# Patient Record
Sex: MALE | Race: WHITE
[De-identification: names, ages, dates, MRNs, and addresses within clinical notes are randomized per-mention and may not be internally consistent; named-entity substitution may affect disease eponyms.]

---

## 2019-12-19 VITALS
SYSTOLIC BLOOD PRESSURE: 124 MMHG | DIASTOLIC BLOOD PRESSURE: 66 MMHG | DIASTOLIC BLOOD PRESSURE: 66 MMHG | SYSTOLIC BLOOD PRESSURE: 124 MMHG

## 2021-06-16 ENCOUNTER — HOSPITAL ENCOUNTER (OUTPATIENT)
Dept: HOSPITAL 61 - NM | Age: 41
End: 2021-06-16
Attending: INTERNAL MEDICINE
Payer: COMMERCIAL

## 2021-06-16 DIAGNOSIS — R07.9: Primary | ICD-10-CM

## 2021-06-16 DIAGNOSIS — Z95.1: ICD-10-CM

## 2021-06-16 PROCEDURE — 78452 HT MUSCLE IMAGE SPECT MULT: CPT

## 2021-06-16 PROCEDURE — 93017 CV STRESS TEST TRACING ONLY: CPT

## 2021-06-16 PROCEDURE — A9500 TC99M SESTAMIBI: HCPCS

## 2021-06-16 NOTE — RAD
MR#: [id number]

Account#: [id number]

Accession#: [id number]

Date of Study: 06/16/2021

Ordering Physician: JOHN HANSEN, 

Referring Physician: TREVOR LOCKWOOD Tech: KARMEN Rosas, ARRT (R) (N)





--------------- APPROVED REPORT --------------





Test Type:          Exercise

Stress Nurse/Tech: Leonila Montero RN

Test Indications: Chest pain x2 months

Cardiac History: CABG 2018,MI

Medications:     See Electronic Medical Record

Medical History: See Electronic Medical Record

Resting ECG:     SB with RBBB

Resting Heart Rate: 55 bpm

Resting Blood Pressure: 128/82mmHg

Pretest Chest Pain: No chest pain



Nurse/Tech Notes

S1,S2 and lungs clear to auscultation.

Consent: The procedure was explained to the patient in lay terms. Informed consent was witnessed. Corbin
eout was entered into [employer]. History and Stress Test performed by RT Cornelius (DINA) (N)



Stress Symptoms

Dyspnea, slight chest pressure during recovery phase of study which resolved by end of study



POST EXERCISE

Reason for Termination: Reached target heart rate, Fatigue

Target HR: Yes

Max HR: 169 bpm

94% of Maximum Predicted HR: 181 bpm

Exercise duration: 11:00 min:sec, 4 Stage

Exercise capacity: 13.4METs

Max Blood Pressure: 153/78mmHg

Blood Pressure response to exercise: Normal blood pressure response during stress.

Heart Rate response to exercise: WNL

Chest Pain: Yes. During recovery phase but subsided by end of study

Arrhythmia: Yes. PVC's

ST Change: No. 



INTERPRETATION

Stress EKG Conclusion: The resting EKG shows a sinus rhythm with a right bundle and nonspecific ST-T 
wave changes.

The stress EKG shows no significant changes from baseline.

Abnormal resting EKG but no EKG evidence of stress-induced ischemia.



Imaging Protocol

IMAGE PROTOCOL: Rest Tc-99m/stress Tc-99m 1 day



Rest:            Stress:         Viability:   

Radiopharm.Tc99m OhilibivoTm02h Sestamibi

Dose10.1mCi            31.2mCi            

Img Date  06/16/2021 06/16/2021      

Inj-Img [id number].           60min.           



Rest Admin Site:IV - Left AntecubitalAdministrator:Shanti Simpson, RT (R)(N)

Stress Admin Site: IV - Left AntecubitalAdministrator: RT Cornelius (R)(N)



STRESS DATA

End Diast. Vol.102.0mlAv. Heart Rate72.0bpm

End Syst. Vol.29.0mlCO Index BSA5.3L/min

Myocardial Gimn218.0gEject. Xqjtcejo97.0%



Stress Rates

Pk. Fill Rate2.83EDV/secLVtime Pk. Fill 128.89msec

Pk. Empty Rate4.58ESV/secLVtime Pk. Gluty556.06msec

1/3 Pk. Fill1.97EDV/sec



Stress Scores

Regional WT0.00Summed WT0.00

Regional WM0.00Summed WM1.00



LV Perfusion

The stress test scans showed no significant defects.

The rest scans showed no significant defects.

Nuclear imaging shows no reversible ischemia or infarct.



Wall Motion

LV systolic function is normal with an ejection fraction of 70%.



LV Perf. Quant

17 Seg. SSS1.00

17 Seg. SRS0.00

17 Seg. SDS1.00

Stress Defect Extent (% LAD)10.00Rest Defect Extent (% LAD)0.00Rev. Defect Extent (% LAD)3.80

Stress Defect Extent (% LCX) 0.00Rest Defect Extent (% LCX)12.50Rev. Defect Extent (% LCX)0.00

Stress Defect Extent (% RCA)0.00Rest Defect Extent (% RCA)0.00Rev. Defect Extent (% RCA)0.00

Stress Defect Extent (% BERNY)3.50Rest Defect Extent (% BERNY)2.60Rev. Defect Extent (% BERNY)1.30



Conclusion

1. Good exercise tolerance with the patient walking for 11 minutes on a Byron protocol.

2. No chest pain with exertion.

3. Abnormal baseline EKG but no EKG evidence of stress-induced ischemia.

4. Nuclear scans showed no significant defects.

5. Normal left ventricular systolic function with an ejection fraction of 70%.

6. Low risk treadmill nuclear stress test.



Signed by : John Hansen MD

Electronically Approved : 06/16/2021 15:26:04

## 2021-08-04 ENCOUNTER — HOSPITAL ENCOUNTER (OUTPATIENT)
Dept: HOSPITAL 61 - NM | Age: 41
End: 2021-08-04
Attending: INTERNAL MEDICINE
Payer: COMMERCIAL

## 2021-08-04 DIAGNOSIS — R10.13: Primary | ICD-10-CM

## 2021-08-04 PROCEDURE — A9541 TC99M SULFUR COLLOID: HCPCS

## 2021-08-04 PROCEDURE — 78264 GASTRIC EMPTYING IMG STUDY: CPT

## 2021-08-04 NOTE — RAD
EXAM: Nuclear gastric emptying scan.



HISTORY: Epigastric pain. Reflux.



COMPARISON: None.



TECHNIQUE: Serial static images were obtained over the stomach following oral administration of 2 mCi
 99m-Tc sulfur colloid.



FINDINGS: The stomach empties into the small bowel without evidence of reflux in the area of the esop
hagus. 



Gastric retention percents:

1 hour 54% (normal range 34.8-91%)

2 hour 36% (normal range 2.7-60%)

3 hour 16% (normal range 0.5-28%)

4 hour 1% (normal range 0-10%) 



The estimated time for half emptying of gastric contents, i.e. 'gastric emptying time' is 76 minutes 
(normal is 66 +/- 22 minutes). 



IMPRESSION: Normal gastric emptying scan.



Electronically signed by: Shanti August MD (8/4/2021 1:10 PM) [id number]

## 2021-08-24 ENCOUNTER — HOSPITAL ENCOUNTER (EMERGENCY)
Dept: HOSPITAL 61 - ER | Age: 41
Discharge: LEFT BEFORE DISCHARGE | End: 2021-08-24
Payer: COMMERCIAL

## 2021-08-24 VITALS — HEIGHT: 68 IN | WEIGHT: 189.6 LBS | BODY MASS INDEX: 28.73 KG/M2

## 2021-08-24 VITALS — DIASTOLIC BLOOD PRESSURE: 78 MMHG | SYSTOLIC BLOOD PRESSURE: 116 MMHG

## 2021-08-24 DIAGNOSIS — Z53.21: ICD-10-CM

## 2021-08-24 DIAGNOSIS — R10.84: Primary | ICD-10-CM

## 2021-08-25 ENCOUNTER — HOSPITAL ENCOUNTER (EMERGENCY)
Dept: HOSPITAL 61 - ER | Age: 41
Discharge: HOME | End: 2021-08-25
Payer: COMMERCIAL

## 2021-08-25 VITALS
SYSTOLIC BLOOD PRESSURE: 126 MMHG | DIASTOLIC BLOOD PRESSURE: 84 MMHG | SYSTOLIC BLOOD PRESSURE: 126 MMHG | DIASTOLIC BLOOD PRESSURE: 84 MMHG

## 2021-08-25 VITALS — HEIGHT: 68 IN | WEIGHT: 190.26 LBS | BODY MASS INDEX: 28.83 KG/M2

## 2021-08-25 DIAGNOSIS — R10.32: ICD-10-CM

## 2021-08-25 DIAGNOSIS — I10: ICD-10-CM

## 2021-08-25 DIAGNOSIS — Z95.1: ICD-10-CM

## 2021-08-25 DIAGNOSIS — R11.2: ICD-10-CM

## 2021-08-25 DIAGNOSIS — R10.31: ICD-10-CM

## 2021-08-25 DIAGNOSIS — R10.10: Primary | ICD-10-CM

## 2021-08-25 DIAGNOSIS — Z86.73: ICD-10-CM

## 2021-08-25 LAB
ALBUMIN SERPL-MCNC: 4.1 G/DL (ref 3.4–5)
ALBUMIN/GLOB SERPL: 1.1 {RATIO} (ref 1–1.7)
ALP SERPL-CCNC: 81 U/L (ref 46–116)
ALT SERPL-CCNC: 87 U/L (ref 16–63)
ANION GAP SERPL CALC-SCNC: 3 MMOL/L (ref 6–14)
APTT PPP: (no result) S
AST SERPL-CCNC: 35 U/L (ref 15–37)
BACTERIA #/AREA URNS HPF: 0 /HPF
BASOPHILS # BLD AUTO: 0 X10^3/UL (ref 0–0.2)
BASOPHILS NFR BLD: 1 % (ref 0–3)
BILIRUB SERPL-MCNC: 0.6 MG/DL (ref 0.2–1)
BILIRUB UR QL STRIP: (no result)
BUN SERPL-MCNC: 13 MG/DL (ref 8–26)
BUN/CREAT SERPL: 11 (ref 6–20)
CALCIUM SERPL-MCNC: 9.2 MG/DL (ref 8.5–10.1)
CHLORIDE SERPL-SCNC: 103 MMOL/L (ref 98–107)
CO2 SERPL-SCNC: 33 MMOL/L (ref 21–32)
CREAT SERPL-MCNC: 1.2 MG/DL (ref 0.7–1.3)
EOSINOPHIL NFR BLD: 0.1 X10^3/UL (ref 0–0.7)
EOSINOPHIL NFR BLD: 1 % (ref 0–3)
ERYTHROCYTE [DISTWIDTH] IN BLOOD BY AUTOMATED COUNT: 13 % (ref 11.5–14.5)
FIBRINOGEN PPP-MCNC: CLEAR MG/DL
GFR SERPLBLD BASED ON 1.73 SQ M-ARVRAT: 67.4 ML/MIN
GLUCOSE SERPL-MCNC: 91 MG/DL (ref 70–99)
HCT VFR BLD CALC: 47.2 % (ref 39–53)
HGB BLD-MCNC: 16.6 G/DL (ref 13–17.5)
LIPASE: 165 U/L (ref 73–393)
LYMPHOCYTES # BLD: 2 X10^3/UL (ref 1–4.8)
LYMPHOCYTES NFR BLD AUTO: 35 % (ref 24–48)
MCH RBC QN AUTO: 32 PG (ref 25–35)
MCHC RBC AUTO-ENTMCNC: 35 G/DL (ref 31–37)
MCV RBC AUTO: 90 FL (ref 79–100)
MONO #: 0.5 X10^3/UL (ref 0–1.1)
MONOCYTES NFR BLD: 9 % (ref 0–9)
NEUT #: 3.1 X10^3/UL (ref 1.8–7.7)
NEUTROPHILS NFR BLD AUTO: 54 % (ref 31–73)
NITRITE UR QL STRIP: NEGATIVE
PH UR STRIP: 5.5 [PH]
PLATELET # BLD AUTO: 229 X10^3/UL (ref 140–400)
POTASSIUM SERPL-SCNC: 4 MMOL/L (ref 3.5–5.1)
PROT SERPL-MCNC: 7.7 G/DL (ref 6.4–8.2)
PROT UR STRIP-MCNC: NEGATIVE MG/DL
RBC # BLD AUTO: 5.26 X10^6/UL (ref 4.3–5.7)
RBC #/AREA URNS HPF: 0 /HPF (ref 0–2)
SODIUM SERPL-SCNC: 139 MMOL/L (ref 136–145)
UROBILINOGEN UR-MCNC: 0.2 MG/DL
WBC # BLD AUTO: 5.8 X10^3/UL (ref 4–11)
WBC #/AREA URNS HPF: (no result) /HPF (ref 0–4)

## 2021-08-25 PROCEDURE — 96374 THER/PROPH/DIAG INJ IV PUSH: CPT

## 2021-08-25 PROCEDURE — 99285 EMERGENCY DEPT VISIT HI MDM: CPT

## 2021-08-25 PROCEDURE — 85025 COMPLETE CBC W/AUTO DIFF WBC: CPT

## 2021-08-25 PROCEDURE — 93005 ELECTROCARDIOGRAM TRACING: CPT

## 2021-08-25 PROCEDURE — 96375 TX/PRO/DX INJ NEW DRUG ADDON: CPT

## 2021-08-25 PROCEDURE — 84484 ASSAY OF TROPONIN QUANT: CPT

## 2021-08-25 PROCEDURE — 96361 HYDRATE IV INFUSION ADD-ON: CPT

## 2021-08-25 PROCEDURE — 36415 COLL VENOUS BLD VENIPUNCTURE: CPT

## 2021-08-25 PROCEDURE — 74177 CT ABD & PELVIS W/CONTRAST: CPT

## 2021-08-25 PROCEDURE — 81001 URINALYSIS AUTO W/SCOPE: CPT

## 2021-08-25 PROCEDURE — 80053 COMPREHEN METABOLIC PANEL: CPT

## 2021-08-25 PROCEDURE — 83690 ASSAY OF LIPASE: CPT

## 2021-08-25 NOTE — EKG
[facility]

              [address] [place] [address]

Test Date:    2021               Test Time:    12:10:46

Pat Name:     GILBERTO CABRAL               Department:   

Patient ID:   [id number]           Room:          

Gender:       M                        Technician:   

:          1980               Requested By: YESIKA FRIAS

Order Number: [id number]           Reading MD:     

                                 Measurements

Intervals                              Axis          

Rate:         54                       P:            52

KY:           142                      QRS:          14

QRSD:         124                      T:            52

QT:           440                                    

QTc:          423                                    

                           Interpretive Statements

SINUS RHYTHM

RIGHT BUNDLE BRANCH BLOCK

RVH WITH REPOLARIZATION ABNORMALITY

ABNORMAL ECG

RI6.02

No previous ECG available for comparison

## 2021-08-25 NOTE — RAD
EXAM: Abdomen and pelvis CT with intravenous contrast.



HISTORY: Pain and vomiting.



TECHNIQUE: Computed tomographic images of the abdomen and pelvis were obtained following the administ
ration of intravenous contrast. Multiplanar reformatting was performed.



*One or more of the following individualized dose reduction techniques were utilized for this examina
tion:  

1. Automated exposure control.  

2. Adjustment of the mA and/or kV according to patient size.  

3. Use of iterative reconstruction technique.



COMPARISON: None.



FINDINGS: Evaluation of the lower thorax demonstrates no infiltrate or pleural effusion. There are mu
ltiple healed rib fractures. There are median sternotomy changes. No hepatic lesion is seen. The gall
bladder, pancreas, spleen, adrenal glands and kidneys are unremarkable. There is no appendicitis. The
re is no bowel obstruction. There is no abnormal bowel wall thickening. The urinary bladder is nearly
 empty. The prostate is unremarkable. The aorta is normal in caliber. There is no lymphadenopathy. Th
ere is no acute or suspicious osseous lesion.



IMPRESSION: No acute abdominal or pelvic finding.



Electronically signed by: Shanti August MD (8/25/2021 1:24 PM) [id number]

## 2021-08-25 NOTE — PHYS DOC
Past Medical History


Past Medical History:  CVA, Hypertension


Additional Past Medical Histor:  H PYLORI,GALL BLADDER PROBLEMS


Past Surgical History:  Coronary Bypass Surgery, Other


Additional Past Surgical Histo:  HIATAL HERNIA SURG,CHEST TUBE/PNEUMOTHORAX


Smoking Status:  Never Smoker


Alcohol Use:  Occasionally


Drug Use:  None





General Adult


EDM:


Chief Complaint:  ABDOMINAL PAIN





HPI:


HPI:





Patient is a 40  year old male who presents with for the last month patient has 

been seeing Dr. Dixon and they were going to do a HIDA scan and then send him 

to get his gallbladder removed.  He states that all the dates and appointments 

for these things were too far out.  He states he called Dr. Dixon and stated 

that he was still in pain and he vomited once yesterday and he is unable to eat 

for the last 2 days.  He states Dr. Dixon told him to come to the emergency 

room.  He states his last bowel movement was yesterday but he has not been going

as much as he usually does but he thinks is because he has not been eating.  He 

states it is a dull aching at times sharp pain that goes from his right flank 

around all the way across his mid abdomen area.  Rates his pain 10 out of 10 at 

this time.  Denies fever, chest pain, shortness of breath, dizziness, headache, 

diarrhea, numbness or tingling.





Review of Systems:


Review of Systems:


Constitutional:   Denies fever or chills. []


Eyes:   Denies change in visual acuity. []


HENT:   Denies nasal congestion or sore throat. [] 


Respiratory:   Denies cough or shortness of breath. [] 


Cardiovascular:   Denies chest pain or edema. [] 


GI:   + abdominal pain, +nausea, +vomiting, denies bloody stools or diarrhea. []

 


:  Denies dysuria. [] 


Musculoskeletal:   Denies back pain or joint pain. [] 


Integument:   Denies rash. [] 


Neurologic:   Denies headache, focal weakness or sensory changes. [] 


Endocrine:   Denies polyuria or polydipsia. [] 


Lymphatic:  Denies swollen glands. [] 


Psychiatric:  Denies depression or anxiety. []





Heart Score:


C/O Chest Pain:  No


Risk Factors:


Risk Factors:  DM, Current or recent (<one month) smoker, HTN, HLP, family 

history of CAD, obesity.


Risk Scores:


Score 0 - 3:  2.5% MACE over next 6 weeks - Discharge Home


Score 4 - 6:  20.3% MACE over next 6 weeks - Admit for Clinical Observation


Score 7 - 10:  72.7% MACE over next 6 weeks - Early Invasive Strategies





Current Medications:





Current Medications








 Medications


  (Trade)  Dose


 Ordered  Sig/Alivia  Start Time


 Stop Time Status Last Admin


Dose Admin


 


 Famotidine


  (Pepcid Vial)  20 mg  1X  ONCE  21 12:00


 21 12:05 DC  





 


 Fentanyl Citrate


  (Fentanyl 2ml


 Vial)  50 mcg  1X  ONCE  21 12:00


 21 12:05 DC  





 


 Sodium Chloride  1,000 ml @ 


 1,000 mls/hr  Q1H  21 12:00


 21 12:59   














Allergies:


Allergies:





Allergies








Coded Allergies Type Severity Reaction Last Updated Verified


 


  No Known Allergies Allergy Unknown  18 Yes











Physical Exam:


PE:





Constitutional: Well developed, well nourished, no acute distress, non-toxic 

appearance. []


HENT: Normocephalic, atraumatic, bilateral external ears normal, oropharynx 

moist, no oral exudates, nose normal. []


Eyes: PERRLA, EOMI, conjunctiva normal, no discharge. [] 


Neck: Normal range of motion, no tenderness, supple, no stridor. [] 


Cardiovascular:Heart rate regular rhythm, no murmur []


Lungs & Thorax:  Bilateral breath sounds clear to auscultation []


Abdomen: Bowel sounds normal, soft, bilateral mid to lower tenderness, no m

asses, no pulsatile masses. [] 


Skin: Warm, dry, no erythema, no rash. [] 


Back: No tenderness, no CVA tenderness. [] 


Extremities: No tenderness, no cyanosis, no clubbing, ROM intact, no edema. [] 


Neurologic: Alert and oriented X 3, normal motor function, normal sensory 

function, no focal deficits noted. []


Psychologic: Affect normal, judgement normal, mood normal. []





Current Patient Data:


Vital Signs:





                                   Vital Signs








  Date Time  Temp Pulse Resp B/P (MAP) Pulse Ox O2 Delivery O2 Flow Rate FiO2


 


21 11:40 98.3 76 16 129/79 (91) 100 Room Air  





 98.3       











EKG:


EK and read by Dr. Fish is sinus rhythm with right bundle branch block and 

no STEMI





Radiology/Procedures:


Radiology/Procedures:


[]


Impression:


                            [facility]


                    [address]  [place], [place] [id number] [phone]


                                        


                                 IMAGING REPORT





                                     Signed





PATIENT: GILBERTO CABRAL     ACCOUNT: [id number]     MRN#: [id number]


: 1980           LOCATION: ER              AGE: 40


SEX: M                    EXAM DT: 21         ACCESSION#: [id number]


STATUS: REG ER            ORD. PHYSICIAN: YESIKA FRIAS


REASON: ABD PAIN WITH VOMITING


PROCEDURE: CT ABD PELV W/ IV CONTRST ONLY








EXAM: Abdomen and pelvis CT with intravenous contrast.





HISTORY: Pain and vomiting.





TECHNIQUE: Computed tomographic images of the abdomen and pelvis were obtained 

following the administration of intravenous contrast. Multiplanar reformatting 

was performed.





*One or more of the following individualized dose reduction techniques were 

utilized for this examination:  


1. Automated exposure control.  


2. Adjustment of the mA and/or kV according to patient size.  


3. Use of iterative reconstruction technique.





COMPARISON: None.





FINDINGS: Evaluation of the lower thorax demonstrates no infiltrate or pleural 

effusion. There are multiple healed rib fractures. There are median sternotomy 

changes. No hepatic lesion is seen. The gallbladder, pancreas, spleen, adrenal 

glands and kidneys are unremarkable. There is no appendicitis. There is no bowel

 obstruction. There is no abnormal bowel wall thickening. The urinary bladder is

 nearly empty. The prostate is unremarkable. The aorta is normal in caliber. 

There is no lymphadenopathy. There is no acute or suspicious osseous lesion.





IMPRESSION: No acute abdominal or pelvic finding.





Electronically signed by: Shanti Curtis MD (2021 1:24 PM) [id number]














DICTATED and SIGNED BY:     SHANTI CURTIS MD


DATE:     21 4695MMH8 0





Course & Med Decision Making:


Course & Med Decision Making


Pertinent Labs and Imaging studies reviewed. (See chart for details)





See HPI.  Alert and oriented x4.  Ambulatory steady gait.  Speaks in full clear 

sentences.  Skin pink warm and dry.  Vital signs are within normal limits.  

Abdomen is soft but tender to the right lower quadrant all the way across to the

 left.  No CVA tenderness.  Bowel sounds are normoactive.





Blood work unremarkable.  Patient has not vomited since he has been here.  CT 

abdomen pelvis shows no acute findings.  Vital signs are unremarkable.  I have 

spoken to [facility] nurse practitioner of GI and she spoke with Dr. Dixon who 

states that everything can be as outpatient and he is stable to be discharged.





[]





Dragon Disclaimer:


Dragon Disclaimer:


This electronic medical record was generated, in whole or in part, using a voice

 recognition dictation system.





Departure


Departure


Impression:  


   Primary Impression:  


   Abdominal pain


   Qualified Codes:  R10.10 - Upper abdominal pain, unspecified


Disposition:   HOME / SELF CARE / HOMELESS


Condition:  STABLE


Referrals:  


WILDER WHITE (PCP)








STEVEN DIXON MD


Patient Instructions:  Abdominal Pain





Additional Instructions:  


Follow-up with Dr. Dixon as scheduled.  Drink plenty of fluids.  Slowly 

advance her diet.  Stay away from fried, fatty foods.


Scripts


Hydrocodone Bit/Acetaminophen (HYDROCODONE-APAP 5-325  **) 1 Tab Tablet


1 TAB PO PRN Q6HRS PRN for PAIN, #10 TAB 0 Refills


   Prov: YESIKA FRIAS APRN         21 


Ondansetron (ONDANSETRON ODT) 4 Mg Tab.rapdis


1 TAB PO PRN Q6-8HRS, #16 TAB


   Prov: YESIKA FRIAS APRN         21











YESIKA FRIAS APRN            Aug 25, 2021 12:14

## 2021-09-01 ENCOUNTER — HOSPITAL ENCOUNTER (OUTPATIENT)
Dept: HOSPITAL 61 - US | Age: 41
End: 2021-09-01
Attending: INTERNAL MEDICINE
Payer: COMMERCIAL

## 2021-09-01 DIAGNOSIS — R11.0: ICD-10-CM

## 2021-09-01 DIAGNOSIS — R10.13: ICD-10-CM

## 2021-09-01 DIAGNOSIS — R93.2: Primary | ICD-10-CM

## 2021-09-01 PROCEDURE — 78227 HEPATOBIL SYST IMAGE W/DRUG: CPT

## 2021-09-01 PROCEDURE — A9537 TC99M MEBROFENIN: HCPCS

## 2021-09-01 PROCEDURE — 76705 ECHO EXAM OF ABDOMEN: CPT

## 2021-09-01 NOTE — RAD
EXAM: ULTRASOUND ABDOMEN LIMITED



CLINICAL HISTORY: Reason: EPIGASTRIC PAIN, NAUSEA / Spl. Instructions:  / History: 



COMPARISON: None available.



TECHNIQUE: Limited ultrasound examination of the right upper quadrant of the abdomen was performed.



FINDINGS:

The pancreas is mostly obscured by overlying bowel gas..



Liver:  15.2 cm in length.  Increased hepatic echogenicity relative to the right kidney consistent wi
th hepatic steatosis..  There are no focal liver lesions. Flow seen within the portal veins.



Biliary: Small echogenic adherent foci within the gallbladder may represent gallbladder polyps or adh
erent gallstones.  No wall thickening or pericholecystic fluid.  There is no pain with direct transdu
cer pressure over the gallbladder.

Common bile duct measures 0.4 cm. 



Right Kidney: 9.4 cm in bipolar length. Normal renal cortical echotexture and thickness. No focal odilon
al lesion, shadowing renal calculus or hydronephrosis.



Visualized portions of the abdominal aorta and inferior vena cava are unremarkable.



There is no free fluid in the subhepatic space.



IMPRESSION: 

1.  Echogenic foci of the gallbladder wall may represent gallstones or gallbladder polyps.

2.  Increased echogenicity of the liver, likely fatty liver.



Electronically signed by: Victor Hugo Lal MD (9/1/2021 11:29 AM) [facility]